# Patient Record
Sex: OTHER/UNKNOWN | URBAN - METROPOLITAN AREA
[De-identification: names, ages, dates, MRNs, and addresses within clinical notes are randomized per-mention and may not be internally consistent; named-entity substitution may affect disease eponyms.]

---

## 2018-04-06 ENCOUNTER — OFFICE VISIT (OUTPATIENT)
Dept: FAMILY MEDICINE | Facility: CLINIC | Age: 83
End: 2018-04-06

## 2018-04-06 DIAGNOSIS — Z02.89 HEALTH EXAMINATION OF DEFINED SUBPOPULATION: Primary | ICD-10-CM

## 2018-04-06 LAB
BILIRUB UR QL: NORMAL
CLARITY: NORMAL
COLOR UR: YELLOW
GLUCOSE URINE: NORMAL MG/DL
HGB UR QL: NORMAL
KETONES UR QL: 5 MG/DL
NITRITE UR QL STRIP: NORMAL
PH UR STRIP: 5.5 PH (ref 5–7)
PROT UR QL: NORMAL MG/DL
SP GR UR STRIP: 1.02 (ref 1–1.03)
SPECIMEN VOL UR: NORMAL ML
UROBILINOGEN UR QL STRIP: 0.2 EU/DL (ref 0.2–1)
WBC #/AREA URNS HPF: NORMAL /[HPF]

## 2018-04-06 PROCEDURE — 99499 UNLISTED E&M SERVICE: CPT | Performed by: FAMILY MEDICINE

## 2018-04-06 PROCEDURE — 81003 URINALYSIS AUTO W/O SCOPE: CPT | Performed by: FAMILY MEDICINE

## 2018-04-06 NOTE — MR AVS SNAPSHOT
"              After Visit Summary   2018    Gracia Employerrelaanay    MRN: [id number]           Patient Information     Date Of Birth          1900        Visit Information        Provider Department      2018 2:00 PM Jacklyn Herring MD [facility]        Today's Diagnoses     Health examination of defined subpopulation    -  1       Follow-ups after your visit        Who to contact     Normal or non-critical lab and imaging results will be communicated to you by MyChart, letter or phone within 4 business days after the clinic has received the results. If you do not hear from us within 7 days, please contact the clinic through MyChart or phone. If you have a critical or abnormal lab result, we will notify you by phone as soon as possible.  Submit refill requests through "[employer] or call your pharmacy and they will forward the refill request to us. Please allow 3 business days for your refill to be completed.          If you need to speak with a  for additional information , please call: [phone]             Additional Information About Your Visit        "[employer] Information     "[employer] lets you send messages to your doctor, view your test results, renew your prescriptions, schedule appointments and more. To sign up, go to [email or web address] . Click on \"Log in\" on the left side of the screen, which will take you to the Welcome page. Then click on \"Sign up Now\" on the right side of the page.     You will be asked to enter the access code listed below, as well as some personal information. Please follow the directions to create your username and password.     Your access code is: [email or web address]  Expires: 2018 10:29 AM     Your access code will  in 90 days. If you need help or a new code, please call your [facility] or [phone].        Care EveryWhere ID     This is your Care EveryWhere ID. This could be used by other organizations to access " your [place] medical records  [id number]         Blood Pressure from Last 3 Encounters:   No data found for BP    Weight from Last 3 Encounters:   No data found for Wt              We Performed the Following     OH U/A W/O MICRO        Primary Care Provider Office Phone # Fax #    Jacklyn Todd Herring [phone] [address] [facility] [address]        Equal Access to Services     : Hadii aad ku hadasho Soomaali, waaxda luqadaha, qaybta kaalmada adeegyada, waxay idiin hayaan adeeg kharash la'aan . So [facility] [phone].    ATENCIÓN: Si habla español, tiene a nguyễn disposición servicios gratuitos de asistencia lingüística. Llame al [phone].    We comply with applicable federal civil rights laws and [place] laws. We do not discriminate on the basis of race, color, national origin, age, disability, sex, sexual orientation, or gender identity.            Thank you!     Thank you for choosing [facility]  for your care. Our goal is always to provide you with excellent care. Hearing back from our patients is one way we can continue to improve our services. Please take a few minutes to complete the written survey that you may receive in the mail after your visit with us. Thank you!             Your Updated Medication List - Protect others around you: Learn how to safely use, store and throw away your medicines at www.disposemymeds.org.      Notice  As of 4/6/2018 11:59 PM    You have not been prescribed any medications.

## 2018-04-06 NOTE — PROGRESS NOTES
"Form MCSA-5875 (revised 2015)                                       B No. [id number]  Expiration Date: 2018    MEDICAL EXAMINATION REPORT FORM  (FOR  MEDICAL CERTIFICATION)    SECTION 1.  Information (to be filled out by )    PERSONAL INFORMATION  Last Name: Martha  First Name: Jg Bean Initial: DREA  : 1968      Age: 49        Street Address: [address] [place] [address]   City: [place]   State/Province: [place]   Zip Code: [address]  's License Number: [id number]      Issuing State/Province: [place]       Phone: [phone]     Gender: male  E-mail (optional): [email or web address]  CLP/CDL Applicant Singh*: yes   ID Verified by**:  Mari Bob CMA    Has your [employer] medical certificate ever been denied or issued for less than 2 years? NO   (*CLP/CDL Applicant/Singh: See instructions for definitions)  (** ID verified By:Record what type of photo ID was used to verify the identity of the , e.g., CDL,'s license, passport)     HEALTH HISTORY  Have you ever had surgery? If \"yes\", please list and explain below. YES    Left knee       Are you currently taking medications (prescription, over-the-counter, herbal remedies, diet supplements)? If \"yes,\" please describe below. NO    Do you have or have you ever had:     1. Head/ brain injuries or illnesses (e.g., concussion)    NO     2. Seizures, epilepsy?   NO     3. Eye problems (except glasses or contacts)?   NO     4. Ear and/or hearing problems   NO     5. Heart disease, heart attack; bypass, or other heart problems   NO     6. Pacemaker, stents, implantable devices, or other heart procedures   NO     7. High blood pressure   NO     8. High cholesterol   NO     9. Chronic (long-term) cough, shortness of breath, or other breathing problems   NO   10. Lung disease (e.g. asthma)   NO   11. Kidney problems, kidney stones, or pain/problems with urination   NO   12. Stomach, liver, or digestive " "problems   NO   13. Diabetes or blood sugar problems        Insulin Used?   NO    NO   14. Anxiety, depression, nervousness, other mental health problems   NO   15. Fainting or passing out   NO   16. Dizziness, headaches, numbness, tingling, or memory loss?   NO   17. Unexplained weight loss   NO   18. Stroke, mini-stroke (TIA), paralysis, or weakness   NO   19. Missing or limited use of arm, hand, finger, leg, foot, toe   NO   20. Neck or back problems   NO   21. Bone, muscle, joint or nerve problems   NO   22. Blood clots or bleeding problems   NO   23. Cancer   NO   24. Chronic (long-term) infection or other chronic diseases   NO   25. Sleep disorders, pauses in breathing while asleep, daytime sleepiness, loud snoring?   NO   26. Have you ever had a sleep test (e.g. sleep apnea)?   NO   27. Have you ever spent a night in the hospital?   NO   28. Have you ever had a broken bone?   YES   29. Have you ever used or do you now use tobacco?   NO   30. Do you currently drink alcohol?   YES   31. Have you used an illegal substance within the past two years?   NO   32. Have you ever failed a drug test or been dependent on an illegal substance?   NO     Other health condition(s) not described above: NO    Did you answer \"yes\" to any of questions 1-32?  If so, please comment further on those health conditions below: YES    Played sports, broke ankles,arm, collar bone, leg, fingers, and ribs              'S SIGNATURE  I certify that the above information is accurate and complete. I understand that inaccurate, false or missing information may invalidate the examination and my Medical Examiner's Certificate, that submission of fraudulent or intentionally false information is a violation of 49CFR 390.35, and that submission of fraudulent or intentionally false information may subject me to civil or ciminal penalties under 49 .37 and 49  Appendices A and B.     's signature:____________Todd J " "Martha________________        Date: 4/6/2018                                         Signature if printed       Section 2. Examination Report (to be filled out by the medical examiner)      HEALTH HISTORY REVIEW  Review and discuss pertinent  answers and any available medical records. 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       ACL reconstruction of the left knee 5 years ago - full function    Broke multiple bones in the past - did hockey and motorcross in the past - everything has fully healed     ETOH - a drink a few times a month  No DUI  0/4 CAGE            TESTING     Pulse rate: 76     Pulse rhythm regular: YES  Height: 5 feet 6.5 inches  Weight: 172.2 pounds    Blood Pressure  Blood Pressure: 132 Systolic  88 Diastolic  Sitting YES  Second Reading (optional) 138/84  Other Testing if indicated           Urinalysis  Urinalysis is required. Numerical readings must be recorded.  Urine Specimen Specific Gravity Protein Blood Sugar    1.025 NEGATIVE NEGATIVE NEGATIVE   Protein, blood or sugar in the urine may be an indication for further testing to rule out any underlying medical problem.    Vision  Standard is at least 20/40 acuity (Snellen) in each eye with or without correction. At least 70  field of vision in horizontal meridian measured in each eye. The use of corrective lenses should be noted on the Medical Examiner's Certificate.    ACUITY UNCORRECTED CORRECTED HORIZONTAL FIELD OF VISION   Right Eye 20/20 N/A Greater than 70 degrees   Left Eye 20/16 N/A Greater than 70 degrees   Both Eyes 20/12.5 N/A      Applicant can recognize and distinguish among traffic control signals and devices showing red, green and sp colors? Yes    Monocular vision: No    Referred to ophthalmologist or optometrist?  No    Received documentation from ophthalmologist or optometrist?  No    HEARING "   Standard: Must first perceive forced whispered voice at not less than 5 feet OR average hearing loss of less than or equal to 40 dB, in better ear (with or without hearing aid).    Check if hearing aid used for test:  Neither    Whispered voice test:    Record the distance from the individual at which a forced whispered voice can first be heard:        Right Ear: greater than 5 feet                  Left Ear: greater than 5 feet             PHYSICAL EXAMINATION  The presence of a certain condition may not necessarily disqualify a , particularly if the condition is controlled adequately, is not likely to worsen, or is readily amenable to treatment. Even if a condition does not disqualify a , the Medical Examiner may consider deferring the  temporarily. Also, the  should be advised to take the necessary steps to correct the condition as soon as possible, particularly if neglecting the condition, could result in more serious illness that might affect driving.    Check the body systems for abnormalities.  BODY SYSTEM Normal or Abnormal   1. General  Normal   2. Skin Normal   3. Eyes Normal   4. Ears Normal   5. Mouth/throat Normal   6. Cardiovascular Normal   7. Lungs/chest Normal   8. Abdomen Normal   9. Genito-urinary System including hernias Normal   10. Back/Spine Normal   11. Extremities/joints Normal   12. Neurological system including reflexes Normal   13. Gait Normal   14. Vascular system Normal     Discuss any abnormal answers in detail in the space below and indicate whether it would affect the 's ability to operate a CMV. Enter applicable item number before each comment.                Please complete only one of the following (Federal or State) Medical Examiner Determination sections:    MEDICAL EXAMINER DETERMINATION (Federal)  Use this section for examinations performed in accordance with the Federal Motor Carrier Safety Regulations (49 ..49):    Meets standards  in 49 .41; qualifies for 2-year certificate            If the  meets the standards outlined in 49 .41, then complete a Medical Examiner's Certificate as stated in 49 .43(h), as appropriate.     I have performed this evaluation for certification. I have personally reviewed all available records and recorded information pertaining to this evaluation, and attest that to the best of my knowledge, I believe it to be true and correct.    Medical Examiner's Signature: _________________________________________                                                                                   (if printed)    Medical Examiner's Name: Jacklyn Herring MD  Medical Examiner's Address:   [address] [address] [phone]  Dept: [phone]    Date Certificate Signed: 4/6/18    Medical Examiner's State License, Certificate, or Registration Number: [id number]    Issuing State:  [place]    National Registry Number:  [id number]                 Medical Examiner's Certificate Expiration Date: 4/6/2020                          Date submitted to registry: 12/07/2018   Submitted by: HONEY

## 2018-11-19 ENCOUNTER — OFFICE VISIT (OUTPATIENT)
Dept: FAMILY MEDICINE | Facility: CLINIC | Age: 83
End: 2018-11-19

## 2018-11-19 DIAGNOSIS — Z02.4 ENCOUNTER FOR DEPARTMENT OF TRANSPORTATION (DOT) EXAMINATION FOR DRIVING LICENSE RENEWAL: Primary | ICD-10-CM

## 2018-11-19 LAB
BILIRUB UR QL: NORMAL
CLARITY: CLEAR
COLOR UR: YELLOW
GLUCOSE URINE: NORMAL MG/DL
HGB UR QL: NORMAL
KETONES UR QL: NORMAL MG/DL
NITRITE UR QL STRIP: NORMAL
PH UR STRIP: 8.5 PH (ref 5–7)
PROT UR QL: NORMAL MG/DL
SP GR UR STRIP: 1.01 (ref 1–1.03)
SPECIMEN VOL UR: NORMAL ML
UROBILINOGEN UR QL STRIP: 1 EU/DL (ref 0.2–1)
WBC #/AREA URNS HPF: NORMAL /[HPF]

## 2018-11-19 PROCEDURE — 99499 UNLISTED E&M SERVICE: CPT | Performed by: FAMILY MEDICINE

## 2018-11-19 PROCEDURE — 81003 URINALYSIS AUTO W/O SCOPE: CPT | Performed by: FAMILY MEDICINE

## 2018-11-19 NOTE — MR AVS SNAPSHOT
"              After Visit Summary   2018    John Employerrelated    MRN: [id number]           Patient Information     Date Of Birth          1900        Visit Information        Provider Department      2018 2:40 PM Fausto Monique MD [facility]        Today's Diagnoses     Encounter for Department of Transportation (DOT) examination for driving license renewal    -  1       Follow-ups after your visit        Who to contact     If you have questions or need follow up information about today's clinic visit or your schedule please contact [facility] directly at [phone].  Normal or non-critical lab and imaging results will be communicated to you by MyChart, letter or phone within 4 business days after the clinic has received the results. If you do not hear from us within 7 days, please contact the clinic through [employer] or phone. If you have a critical or abnormal lab result, we will notify you by phone as soon as possible.  Submit refill requests through [employer] or call your pharmacy and they will forward the refill request to us. Please allow 3 business days for your refill to be completed.          Additional Information About Your Visit        MyChart Information     [employer] lets you send messages to your doctor, view your test results, renew your prescriptions, schedule appointments and more. To sign up, go to [email or web address] . Click on \"Log in\" on the left side of the screen, which will take you to the Welcome page. Then click on \"Sign up Now\" on the right side of the page.     You will be asked to enter the access code listed below, as well as some personal information. Please follow the directions to create your username and password.     Your access code is: [email or web address]  Expires: 2019  1:48 PM     Your access code will  in 90 days. If you need help or a new code, please call your [facility] or [phone].      "   Care EveryWhere ID     This is your Care EveryWhere ID. This could be used by other organizations to access your [place] medical records  [id number]         Blood Pressure from Last 3 Encounters:   No data found for BP    Weight from Last 3 Encounters:   No data found for Wt              We Performed the Following     OH U/A W/O MICRO        Primary Care Provider Fax #    Physician No Ref-Primary [phone]       No address on file        Equal Access to Services     DERECK VERDUGO : Hadii aad ku hadasho Soomaali, waaxda luqadaha, qaybta kaalmada adeegyada, waxay idiin hayaan adeeg khjoselinesh lachristiano . So [facility] [phone].    ATENCIÓN: Si habla español, tiene a parrish disposición servicios gratuitos de asistencia lingüística. Llame al [phone].    We comply with applicable federal civil rights laws and [place] laws. We do not discriminate on the basis of race, color, national origin, age, disability, sex, sexual orientation, or gender identity.            Thank you!     Thank you for choosing [facility]  for your care. Our goal is always to provide you with excellent care. Hearing back from our patients is one way we can continue to improve our services. Please take a few minutes to complete the written survey that you may receive in the mail after your visit with us. Thank you!             Your Updated Medication List - Protect others around you: Learn how to safely use, store and throw away your medicines at www.disposemymeds.org.      Notice  As of 11/19/2018 11:59 PM    You have not been prescribed any medications.

## 2018-11-19 NOTE — PROGRESS NOTES
"Form [id number]-5875                                  [facility] No. [id number]  Expiration Date: 2019  MEDICAL EXAMINATION REPORT FORM  (FOR  MEDICAL CERTIFICATION)    SECTION 1.  Information (to be filled out by )    PERSONAL INFORMATION    Last Name: Nitish  First Name: Raul Lindo Initial: MAGGIE  : 1970      Age: 48        Street Address: [address] [place] [address]   City: [place]   State/Province: [place]   Zip Code: [address]  's License Number: [id number]      Issuing State/Province: [place]       Phone: [phone]     Gender: male  E-mail (optional): [email or web address]  CLP/CDL Applicant Rooney*: yes   ID Verified by**:  KMF  Has your USDOT/FMCSA medical certificate ever been denied or issued for less than 2 years? NO     (*CLP/CDL Applicant/Rooney: See instructions for definitions)  (** ID verified By:Record what type of photo ID was used to verify the identity of the , e.g., CDL,'s license, passport)       HEALTH HISTORY    Have you ever had surgery? If \"yes\", please list and explain below.  [  ] Yes [  ]  No  [  ] Not Sure         Are you currently taking medications (prescription, over-the-counter, herbal remedies, diet supplements)? If \"yes,\" please describe below.   [  ] Yes [  ]  No  [  ] Not Sure          HEALTH HISTORY (continued)          Do you have or have you ever had:                                                     Not  Yes    No     Sure                                                                          Not    Yes     No   Sure    1. Head/brain injuries or illness (e.g., concussion)  x     16. Dizziness, headaches, numbness, tingling, or memory loss  x       2. Seizures, epilepsy  x     17. Unexplained weight loss  x       3. Eye problems (except glasses or contacts)  x     18. Stroke, mini stroke (TIA), paralysis, or weakness  x       4. Ear and/or hearing problems  x     19. Missing or limited use of arm, hand, " "finger, leg, foot, toe  x       5. Heart disease, heart attack, bypass, or other heart problems  x     20. Neck or back problems  x       6. Pacemaker, stents, implantable devices, or other heart procedures  x     21. Bone, muscle, joint or nerve problems  x       7. High blood preassure  x     22. Blood clots or bleeding problems  x       8. High cholesterol  x     23. Cancer  x       9. Chronic (long term) cough, shortness of breath, or other breathing problems  x     24. Chronic (long term) infection or other chronic disease  x       10. Lung disease (e.g., asthma)  x     25. Sleep disorders, pauses in breathing while asleep, daytime sleepiness, loud snoring  x       11. Kidney problems, kidney stones, or pain/problems with urination  x     26. Have you ever had a sleep test? (e.g., sleep apnea)  x       12. Stomach, liver, or digestive problems  x     27. Have you ever spent the night in the hospital?  x       13. Diabetes or blood sugar problems  x     28. Have you ever had a broken bone?  x             Insulin used  x     29. Have you ever used or do you now use tobacco?  x       14. Anxiety, depression, nervousness, other mental health problems  x     30. Do you currently drink alcohol?  x        15. Fainting or passing out  x     31.  Have you used an illegal substance within the past two years?   x         32. Have you ever failed a drug test or been dependent on an illegal substance?   x         Other health condition(s) not described above: [  ] Yes [  x]  No  [  ] Not Sure         Did you answer \"yes\" to any of questions 1-32?  If so, please comment further on those health conditions below: [ x ] Yes [  ]  No  [  ] Not Sure    Alcohol- 5 per week            'S SIGNATURE  I certify that the above information is accurate and complete. I understand that inaccurate, false or missing information may invalidate the examination and my Medical Examiner's Certificate, that submission of fraudulent or " "intentionally false information is a violation of 49CFR 390.35, and that submission of fraudulent or intentionally false information may subject me to civil or ciminal penalties under 49 .37 and 49  Appendices A and B.     's signature:____________________________        Date: 11/19/2018                                         Signature if printed       Section 2. Examination Report (to be filled out by the medical examiner)      HEALTH HISTORY REVIEW  Review and discuss pertinent  answers and any available medical records. 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                    TESTING     Pulse rate: 78     Pulse rhythm regular: YES  Height: 5 feet 8 inches  Weight: 174 pounds    Blood Pressure  Blood Pressure: 120 Systolic  80 Diastolic  Sitting yes  Second Reading (optional)   Other Testing if indicated           Urinalysis  Urinalysis is required. Numerical readings must be recorded.  Urine Specimen Specific Gravity Protein Blood Sugar    1.015 NEGATIVE NEGATIVE NEGATIVE   Protein, blood or sugar in the urine may be an indication for further testing to rule out any underlying medical problem.    Vision  Standard is at least 20/40 acuity (Snellen) in each eye with or without correction. At least 70  field of vision in horizontal meridian measured in each eye. The use of corrective lenses should be noted on the Medical Examiner's Certificate.    ACUITY UNCORRECTED CORRECTED HORIZONTAL FIELD OF VISION   Right Eye 20/25  Greater than 70 degrees   Left Eye 20/25  Greater than 70 degrees   Both Eyes 20/25       Applicant can recognize and distinguish among traffic control signals and devices showing red, green and arianne colors? Yes    Monocular vision: No    Referred to ophthalmologist or optometrist?  No    Received documentation from ophthalmologist or optometrist?  " No    HEARING   Standard: Must first perceive forced whispered voice at not less than 5 feet OR average hearing loss of less than or equal to 40 dB, in better ear (with or without hearing aid).    Check if hearing aid used for test:  Neither    Whispered voice test:    Record the distance from the individual at which a forced whispered voice can first be heard:        Right Ear: greater than 5 feet                  Left Ear: greater than 5 feet             PHYSICAL EXAMINATION  The presence of a certain condition may not necessarily disqualify a , particularly if the condition is controlled adequately, is not likely to worsen, or is readily amenable to treatment. Even if a condition does not disqualify a , the Medical Examiner may consider deferring the  temporarily. Also, the  should be advised to take the necessary steps to correct the condition as soon as possible, particularly if neglecting the condition, could result in more serious illness that might affect driving.    Check the body systems for abnormalities.  BODY SYSTEM Normal or Abnormal   1. General  Normal   2. Skin Normal   3. Eyes Normal   4. Ears Normal   5. Mouth/throat Normal   6. Cardiovascular Normal   7. Lungs/chest Normal   8. Abdomen Normal   9. Genito-urinary System including hernias Normal   10. Back/Spine Normal   11. Extremities/joints Normal   12. Neurological system including reflexes Normal   13. Gait Normal   14. Vascular system Normal     Discuss any abnormal answers in detail in the space below and indicate whether it would affect the 's ability to operate a CMV. Enter applicable item number before each comment.                 Please complete only one of the following (Federal or State) Medical Examiner Determination sections:    MEDICAL EXAMINER DETERMINATION (Federal)  Use this section for examinations performed in accordance with the Federal Motor Carrier Safety Regulations (49 CFR  391..49):    Meets standards in 49 .41; qualifies for 2-year certificate            If the  meets the standards outlined in 49 .41, then complete a Medical Examiner's Certificate as stated in 49 .43(h), as appropriate.     I have performed this evaluation for certification. I have personally reviewed all available records and recorded information pertaining to this evaluation, and attest that to the best of my knowledge, I believe it to be true and correct.    Medical Examiner's Signature: _________________________________________                                                                                   (if printed)    Medical Examiner's Name: Fausto Monique MD  Medical Examiner's Address:   [address] [address]  [phone]  Dept: [phone]    Date Certificate Signed: 11/19/18    Medical Examiner's State License, Certificate, or Registration Number: [id number]    Issuing State:  [place]    National Registry Number: [id number]                   Medical Examiner's Certificate Expiration Date: 11/19/2020                          Date submitted to registry: 11/21/2018  Submitted by: Jennifer Lovelace MA

## 2021-01-14 ENCOUNTER — OFFICE VISIT (OUTPATIENT)
Dept: FAMILY MEDICINE | Facility: CLINIC | Age: 86
End: 2021-01-14

## 2021-01-14 DIAGNOSIS — Z02.89 HEALTH EXAMINATION OF DEFINED SUBPOPULATION: Primary | ICD-10-CM

## 2021-01-14 LAB
BILIRUB UR QL: NORMAL
CLARITY: CLEAR
COLOR UR: YELLOW
GLUCOSE URINE: NORMAL MG/DL
HGB UR QL: NORMAL
KETONES UR QL: NORMAL MG/DL
NITRITE UR QL STRIP: NORMAL
PH UR STRIP: 7 PH (ref 5–7)
PROT UR QL: NORMAL MG/DL
SP GR UR STRIP: 1.02 (ref 1–1.03)
SPECIMEN VOL UR: NORMAL ML
UROBILINOGEN UR QL STRIP: 0.2 EU/DL (ref 0.2–1)
WBC #/AREA URNS HPF: NORMAL /[HPF]

## 2021-01-14 PROCEDURE — 99499 UNLISTED E&M SERVICE: CPT | Performed by: FAMILY MEDICINE

## 2021-01-14 PROCEDURE — 81003 URINALYSIS AUTO W/O SCOPE: CPT | Performed by: FAMILY MEDICINE

## 2021-01-14 NOTE — PROGRESS NOTES
"Form MCSA-5875                                  [facility] No. [id number]  Expiration Date: 21  MEDICAL EXAMINATION REPORT FORM  (FOR  MEDICAL CERTIFICATION)    SECTION 1.  Information (to be filled out by )    PERSONAL INFORMATION    Last Name: Daisy  First Name: Christopher Lau Initial: A  : 1970      Age: 50        Street Address: [address] [place] [address]   City: [place]   State/Province: [place]   Zip Code: [address]  's License Number: [id number]      Issuing State/Province: [place]       Phone: [phone]     Gender: male  E-mail (optional):   CLP/CDL Applicant Lebron*: yes   ID Verified by**:  Susanna Baron CMA   Has your USDOT/FMCSA medical certificate ever been denied or issued for less than 2 years? NO     (*CLP/CDL Applicant/Lebron: See instructions for definitions)  (** ID verified By:Record what type of photo ID was used to verify the identity of the , e.g., CDL,'s license, passport)       HEALTH HISTORY    Have you ever had surgery? If \"yes\", please list and explain below.  [  ] Yes [ x ]  No  [  ] Not Sure         Are you currently taking medications (prescription, over-the-counter, herbal remedies, diet supplements)? If \"yes,\" please describe below.   [  ] Yes [ x ]  No  [  ] Not Sure          HEALTH HISTORY (continued)          Do you have or have you ever had:                                                     Not  Yes    No     Sure                                                                          Not    Yes     No   Sure    1. Head/brain injuries or illness (e.g., concussion)  x     16. Dizziness, headaches, numbness, tingling, or memory loss  x       2. Seizures, epilepsy  x     17. Unexplained weight loss  x       3. Eye problems (except glasses or contacts)  x     18. Stroke, mini stroke (TIA), paralysis, or weakness  x       4. Ear and/or hearing problems  x     19. Missing or limited use of arm, " "hand, finger, leg, foot, toe  x       5. Heart disease, heart attack, bypass, or other heart problems  x     20. Neck or back problems  x       6. Pacemaker, stents, implantable devices, or other heart procedures  x     21. Bone, muscle, joint or nerve problems  x       7. High blood preassure  x     22. Blood clots or bleeding problems  x       8. High cholesterol  x     23. Cancer  x       9. Chronic (long term) cough, shortness of breath, or other breathing problems  x     24. Chronic (long term) infection or other chronic disease  x       10. Lung disease (e.g., asthma)  x     25. Sleep disorders, pauses in breathing while asleep, daytime sleepiness, loud snoring  x       11. Kidney problems, kidney stones, or pain/problems with urination  x     26. Have you ever had a sleep test? (e.g., sleep apnea)  x       12. Stomach, liver, or digestive problems  x     27. Have you ever spent the night in the hospital?  x       13. Diabetes or blood sugar problems  x     28. Have you ever had a broken bone?  x             Insulin used  x     29. Have you ever used or do you now use tobacco?  x       14. Anxiety, depression, nervousness, other mental health problems  x     30. Do you currently drink alcohol?  x        15. Fainting or passing out  x     31.  Have you used an illegal substance within the past two years?   x         32. Have you ever failed a drug test or been dependent on an illegal substance?   x         Other health condition(s) not described above: [  ] Yes [ x ]  No  [  ] Not Sure         Did you answer \"yes\" to any of questions 1-32?  If so, please comment further on those health conditions below: [  ] Yes [ x ]  No  [  ] Not Sure                'S SIGNATURE  I certify that the above information is accurate and complete. I understand that inaccurate, false or missing information may invalidate the examination and my Medical Examiner's Certificate, that submission of fraudulent or intentionally " "false information is a violation of 49CFR 390.35, and that submission of fraudulent or intentionally false information may subject me to civil or ciminal penalties under 49 .37 and 49  Appendices A and B.     's signature:____________________________        Date: 1/14/2021                                         Signature if printed       Section 2. Examination Report (to be filled out by the medical examiner)      HEALTH HISTORY REVIEW  Review and discuss pertinent  answers and any available medical records. 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                    TESTING     Pulse rate: 60     Pulse rhythm regular: YES  Height: 5 feet 9 inches  Weight: 176  pounds    Blood Pressure  Blood Pressure: 120 Systolic  82 Diastolic  Sitting   Second Reading (optional)   Other Testing if indicated           Urinalysis  Urinalysis is required. Numerical readings must be recorded.  Urine Specimen Specific Gravity Protein Blood Sugar    1.020 NEGATIVE NEGATIVE NEGATIVE   Protein, blood or sugar in the urine may be an indication for further testing to rule out any underlying medical problem.    Vision  Standard is at least 20/40 acuity (Snellen) in each eye with or without correction. At least 70  field of vision in horizontal meridian measured in each eye. The use of corrective lenses should be noted on the Medical Examiner's Certificate.    ACUITY UNCORRECTED CORRECTED HORIZONTAL FIELD OF VISION   Right Eye 20/25 N/A Greater than 70 degrees   Left Eye 20/25 N/A Greater than 70 degrees   Both Eyes 20/25 N/A      Applicant can recognize and distinguish among traffic control signals and devices showing red, green and saúl colors? [ x ] Yes [  ]  No    Monocular vision: No    Referred to ophthalmologist or optometrist?  No    Received documentation from ophthalmologist or " optometrist?  No    HEARING   Standard: Must first perceive forced whispered voice at not less than 5 feet OR average hearing loss of less than or equal to 40 dB, in better ear (with or without hearing aid).    Check if hearing aid used for test:  [  ] Right Ear [  ]  Left Ear [ x ] Neither    Whispered voice test:    Record the distance from the individual at which a forced whispered voice can first be heard:        Right Ear: greater than 5 feet                  Left Ear: greater than 5 feet             PHYSICAL EXAMINATION  The presence of a certain condition may not necessarily disqualify a , particularly if the condition is controlled adequately, is not likely to worsen, or is readily amenable to treatment. Even if a condition does not disqualify a , the Medical Examiner may consider deferring the  temporarily. Also, the  should be advised to take the necessary steps to correct the condition as soon as possible, particularly if neglecting the condition, could result in more serious illness that might affect driving.    Check the body systems for abnormalities.  BODY SYSTEM Normal or Abnormal   1. General  Normal   2. Skin Normal   3. Eyes Normal   4. Ears Normal   5. Mouth/throat Normal   6. Cardiovascular Normal   7. Lungs/chest Normal   8. Abdomen Normal   9. Genito-urinary System including hernias Normal   10. Back/Spine Normal   11. Extremities/joints Normal   12. Neurological system including reflexes Normal   13. Gait Normal   14. Vascular system Normal     Discuss any abnormal answers in detail in the space below and indicate whether it would affect the 's ability to operate a CMV. Enter applicable item number before each comment.                 Please complete only one of the following (Federal or State) Medical Examiner Determination sections:      MEDICAL EXAMINER DETERMINATION (Federal)  Use this section for examinations performed in accordance with the Federal Motor  Carrier Safety Regulations (49 ..49):    [  ] Does not meet standards (specify reason) ________________________________________________________________________________  [ x ] Meets standards in 49 .41; qualifies for 2-year certificate  [  ] Meets standards, but periodic monitoring required (specify reason) ___________________________________________________________         qualified for:   [  ] 3 months               [  ] 6 months               [  ] 1 year            [  ] other (specify): ________________________________  [  ]  Wearing corrective lenses        [  ] Wearing hearing aid        [  ] Accompanied by a waiver/exception(specify type): ____________________  [  ] Accompanied by a Skill Performance Evaluation (SPE) Certificate    [  ]  Qualified by operation of 49 .64 (Federal)  [  ] Driving within an exempt intracity zone (see 49 .62) (Federal)  [  ] Determination pending (specify reason) __________________________________________________________________________________        [  ] Return to medical exam office for follow-up on (must be 45 days or less _______________________________        [  ] Medical Examination Report amended (specify reason) ______________________________________________                    (if amended) Medical Examiner's Signature _____________________________________________________ Date: _______________  [  ] Incomplete examination (specify reason): _________________________________________________________________________________            If the  meets the standards outlined in 49 .41, then complete a Medical Examiner's Certificate as stated in 49 .43(h), as appropriate.     I have performed this evaluation for certification. I have personally reviewed all available records and recorded information pertaining to this evaluation, and attest that to the best of my knowledge, I believe it to be true and correct.    Medical  Examiner's Signature: _________________________________________                                                                                   (if printed)    Medical Examiner's Name: Jefferson Huston MD  Medical Examiner's Address:   [address] [address]  [phone]  Dept: [phone]    Date Certificate Signed: 1/14/2021    Medical Examiner's State License, Certificate, or Registration Number: [id number]     Issuing State:  [place]    [ x ] M.D.   [  ] D.O.   [  ] Physician Assistant   [  ] Chiropractor   [  ] Advanced Practice Nurse  [  ] Other Practitioner (specify) __________________________________________________    National Registry Number:  [id number]                 Medical Examiner's Certificate Expiration Date: 01/14/2023               Date submitted to registry: 01/14/2021  Submitted by: Susanna Baron CMA

## 2023-01-31 NOTE — PROGRESS NOTES
Form [id number]                                  [facility] [id number]  Expiration Date: 3/31/2025  MEDICAL EXAMINATION REPORT FORM  (FOR  MEDICAL CERTIFICATION)    SECTION 1.  Information (to be filled out by )    PERSONAL INFORMATION    Last Name: Daisy, First Name: Christopher Lau Initial: SARATH  : 1970      Age: 52        Street Address: [address] [place] [address]   City: [place]   State/Province: [place]   Zip Code: [id number]  's License Number: [id number]      Issuing State/Province: [place]       Phone: [phone]       E-mail (optional): 0  CLP/CDL Applicant Lebron*: yes   ID Verified by**:  dw  Has your [employer] medical certificate ever been denied or issued for less than 2 years? NO       TESTING       Blood Pressure  Blood Pressure: 131 Systolic  80 Diastolic  Sitting yes  Second Reading (optional) 0  Other Testing if indicated    *       Urinalysis  Urinalysis is required. Numerical readings must be recorded.  Urine Specimen Specific Gravity Protein Blood Sugar    1.020 NEGATIVE NEGATIVE NEGATIVE   Protein, blood or sugar in the urine may be an indication for further testing to rule out any underlying medical problem.           PHYSICAL EXAMINATION  The presence of a certain condition may not necessarily disqualify a , particularly if the condition is controlled adequately, is not likely to worsen, or is readily amenable to treatment. Even if a condition does not disqualify a , the Medical Examiner may consider deferring the  temporarily. Also, the  should be advised to take the necessary steps to correct the condition as soon as possible, particularly if neglecting the condition, could result in more serious illness that might affect driving.      Discuss any abnormal answers in detail in the space below and indicate whether it would affect the 's ability to operate a CMV. Enter applicable item number before each comment.    SEE  SCANNED FORMS            Please complete only one of the following (Federal or State) Medical Examiner Determination sections:      MEDICAL EXAMINER DETERMINATION (Federal)  Use this section for examinations performed in accordance with the Federal Motor Carrier Safety Regulations (49 ..49):    [  ] Does not meet standards (specify reason) ________________________________________________________________________________  [ x ] Meets standards in 49 .41; qualifies for 2-year certificate  [  ] Meets standards, but periodic monitoring required (specify reason) ___________________________________________________________         qualified for:   [  ] 3 months               [  ] 6 months               [  ] 1 year            [  ] other (specify): ________________________________  [  ]  Wearing corrective lenses        [  ] Wearing hearing aid        [  ] Accompanied by a waiver/exception(specify type): ____________________  [  ] Accompanied by a Skill Performance Evaluation (SPE) Certificate    [  ]  Qualified by operation of 49 .64 (Federal)  [  ] Driving within an exempt intracity zone (see 49 .62) (Federal)  [  ] Determination pending (specify reason) __________________________________________________________________________________        [  ] Return to medical exam office for follow-up on (must be 45 days or less _______________________________        [  ] Medical Examination Report amended (specify reason) ______________________________________________                    (if amended) Medical Examiner's Signature _____________________________________________________ Date: _______________  [  ] Incomplete examination (specify reason): _________________________________________________________________________________            If the  meets the standards outlined in 49 .41, then complete a Medical Examiner's Certificate as stated in 49 .43(h), as appropriate.      I have performed this evaluation for certification. I have personally reviewed all available records and recorded information pertaining to this evaluation, and attest that to the best of my knowledge, I believe it to be true and correct.    Medical Examiner's Signature: _________________________________________                                                                                   (if printed)    Medical Examiner's Name: Jefferson Huston MD  Medical Examiner's Address:   [address] [address]  [phone]  Dept: [phone]    Date Certificate Signed: 02/01/2023    Medical Examiner's State License, Certificate, or Registration Number: [id number]    Issuing State:  [place]    [ x ] M.D.   [  ] D.O.   [  ] Physician Assistant   [  ] Chiropractor   [  ] Advanced Practice Nurse  [  ] Other Practitioner (specify) __________________________________________________    National Registry Number:  [id number]                Medical Examiner's Certificate Expiration Date: 02/01/2025                     Date submitted to registry: 2/2/23    Submitted by: Susanna Baron

## 2023-02-01 ENCOUNTER — OFFICE VISIT (OUTPATIENT)
Dept: FAMILY MEDICINE | Facility: CLINIC | Age: 88
End: 2023-02-01

## 2023-02-01 DIAGNOSIS — Z02.89 ENCOUNTER FOR EXAMINATION REQUIRED BY DEPARTMENT OF TRANSPORTATION (DOT): Primary | ICD-10-CM

## 2023-02-01 PROCEDURE — 81003 URINALYSIS AUTO W/O SCOPE: CPT | Performed by: FAMILY MEDICINE

## 2023-02-01 PROCEDURE — 99499 UNLISTED E&M SERVICE: CPT | Performed by: FAMILY MEDICINE
